# Patient Record
Sex: MALE | Race: WHITE
[De-identification: names, ages, dates, MRNs, and addresses within clinical notes are randomized per-mention and may not be internally consistent; named-entity substitution may affect disease eponyms.]

---

## 2019-10-16 ENCOUNTER — HOSPITAL ENCOUNTER (EMERGENCY)
Dept: HOSPITAL 54 - ER | Age: 29
Discharge: HOME | End: 2019-10-16
Payer: SELF-PAY

## 2019-10-16 VITALS — SYSTOLIC BLOOD PRESSURE: 122 MMHG | DIASTOLIC BLOOD PRESSURE: 71 MMHG

## 2019-10-16 VITALS — BODY MASS INDEX: 29.22 KG/M2 | WEIGHT: 235 LBS | HEIGHT: 75 IN
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
## 2019-10-16 DIAGNOSIS — F19.11: ICD-10-CM

## 2019-10-16 DIAGNOSIS — R00.0: ICD-10-CM

## 2019-10-16 DIAGNOSIS — Z60.2: ICD-10-CM

## 2019-10-16 DIAGNOSIS — S61.219A: ICD-10-CM
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
## 2019-10-16 DIAGNOSIS — F17.200: ICD-10-CM
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
## 2019-10-16 DIAGNOSIS — F10.10: ICD-10-CM

## 2019-10-16 DIAGNOSIS — T40.5X1A: ICD-10-CM
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
## 2019-10-16 DIAGNOSIS — S61.411A: Primary | ICD-10-CM

## 2019-10-16 DIAGNOSIS — F41.9: ICD-10-CM

## 2019-10-16 DIAGNOSIS — S61.412A: ICD-10-CM

## 2019-10-16 PROCEDURE — A6403 STERILE GAUZE>16 <= 48 SQ IN: HCPCS

## 2019-10-16 PROCEDURE — 90471 IMMUNIZATION ADMIN: CPT

## 2019-10-16 PROCEDURE — 99284 EMERGENCY DEPT VISIT MOD MDM: CPT

## 2019-10-16 PROCEDURE — 99406 BEHAV CHNG SMOKING 3-10 MIN: CPT

## 2019-10-16 PROCEDURE — 93005 ELECTROCARDIOGRAM TRACING: CPT

## 2019-10-16 PROCEDURE — 90715 TDAP VACCINE 7 YRS/> IM: CPT

## 2019-10-16 PROCEDURE — 96372 THER/PROPH/DIAG INJ SC/IM: CPT

## 2019-10-16 SDOH — SOCIAL STABILITY - SOCIAL INSECURITY: PROBLEMS RELATED TO LIVING ALONE: Z60.2

## 2019-10-16 NOTE — NUR
BIB  AND [employer], 28 YEAR OLD MALE C/O LACERATION TO RIGHT HAND. 
ALERT AND ORIENTED X4 BREATHING EVEN AND UNLABORED WITH NO DISTRESS NOTED. SKIN 
INTACT. RIGHT HAND NOTED WITH DRY BLOOD. WAITING TO BE SEEN BY MD